# Patient Record
Sex: MALE
[De-identification: names, ages, dates, MRNs, and addresses within clinical notes are randomized per-mention and may not be internally consistent; named-entity substitution may affect disease eponyms.]

---

## 2021-04-14 ENCOUNTER — HOSPITAL ENCOUNTER (EMERGENCY)
Dept: HOSPITAL 5 - ED | Age: 51
LOS: 1 days | Discharge: HOME | End: 2021-04-15
Payer: SELF-PAY

## 2021-04-14 VITALS — SYSTOLIC BLOOD PRESSURE: 180 MMHG | DIASTOLIC BLOOD PRESSURE: 118 MMHG

## 2021-04-14 DIAGNOSIS — I10: ICD-10-CM

## 2021-04-14 DIAGNOSIS — F32.9: ICD-10-CM

## 2021-04-14 DIAGNOSIS — Z79.899: ICD-10-CM

## 2021-04-14 DIAGNOSIS — M25.562: Primary | ICD-10-CM

## 2021-04-14 DIAGNOSIS — E11.9: ICD-10-CM

## 2021-04-14 PROCEDURE — 99283 EMERGENCY DEPT VISIT LOW MDM: CPT

## 2021-04-14 NOTE — XRAY REPORT
LEFT TIBIA-FIBULA 2 VIEW(S)



INDICATION / CLINICAL INFORMATION:

pain to leg



COMPARISON:

None available.

 

FINDINGS:



BONES / JOINT(S): No acute fracture or subluxation. No significant arthritis.

SOFT TISSUES: No significant abnormality.



ADDITIONAL FINDINGS: None.







Signer Name: Alen Martin MD 

Signed: 4/14/2021 8:17 PM

Workstation Name: [id number]

## 2021-04-14 NOTE — EMERGENCY DEPARTMENT REPORT
ED Lower Extremity HPI





- General


Chief Complaint: Extremity Injury, Lower


Stated Complaint: L KNEE PAIN


Time Seen by Provider: 04/14/21 19:49


Source: patient


Mode of arrival: Ambulatory


Limitations: No Limitations





- History of Present Illness


Initial Comments: 


50-year-old -American male presents emerge department complaining of a 3-

hour history of left leg pain that is sharp and lancinating down the leg of 

unknown etiology.  He reports having a slip and fall sometime earlier today but 

is not sure that something to do with the pain.  Currently is ambulatory without

any complication reports no loss of bowel or bladder, no saddle paresthesias, no

fever, chills, sweats, no loss of strength but pain is worse with palpation 

range of motion.





MD Complaint: leg injury


-: Gradual


Injury: Leg: Left


Place: home


Severity: mild, moderate


Improves With: nothing


Worsens With: nothing


Associated Symptoms: denies: snap/pop sensation





- Related Data


                                    Allergies











Allergy/AdvReac Type Severity Reaction Status Date / Time


 


No Known Allergies Allergy   Verified 04/14/21 19:30














ED Review of Systems


ROS: 


Stated complaint: L KNEE PAIN


Other details as noted in HPI





Comment: All other systems reviewed and negative





ED Past Medical Hx





- Past Medical History


Previous Medical History?: Yes


Hx Hypertension: Yes


Hx Diabetes: Yes


Hx Psychiatric Treatment: Yes (bipolar)





- Surgical History


Past Surgical History?: No





- Social History


Smoking Status: Never Smoker


Substance Use Type: None





ED Physical Exam





- General


Limitations: No Limitations


General appearance: alert, in no apparent distress





- Head


Head exam: Present: atraumatic, normocephalic





- Eye


Eye exam: Present: normal appearance





- ENT


ENT exam: Present: mucous membranes moist





- Neck


Neck exam: Present: normal inspection





- Respiratory


Respiratory exam: Present: normal lung sounds bilaterally.  Absent: respiratory 

distress





- Cardiovascular


Cardiovascular Exam: Present: regular rate, normal rhythm.  Absent: systolic 

murmur, diastolic murmur, rubs, gallop





- GI/Abdominal


GI/Abdominal exam: Present: soft, normal bowel sounds





- Rectal


Rectal exam: Present: deferred





- Extremities Exam


Extremities exam: Present: normal inspection, tenderness





- Back Exam


Back exam: Present: normal inspection.  Absent: CVA tenderness (R), CVA 

tenderness (L), muscle spasm, paraspinal tenderness





- Neurological Exam


Neurological exam: Present: alert, oriented X3, CN II-XII intact, normal gait





- Psychiatric


Psychiatric exam: Present: normal affect, normal mood.  Absent: anxious, flat 

affect





- Skin


Skin exam: Present: warm, dry, intact, normal color.  Absent: rash, cyanosis, 

diaphoretic





ED Course


                                   Vital Signs











  04/14/21





  19:27


 


Temperature 98.7 F


 


Pulse Rate 94 H


 


Respiratory 18





Rate 


 


Blood Pressure 180/118


 


O2 Sat by Pulse 98





Oximetry 











Critical care attestation.: 


If time is entered above; I have spent that time in minutes in the direct care 

of this critically ill patient, excluding procedure time.








ED Disposition


Disposition: DC-01 TO HOME OR SELFCARE


Is pt being admited?: No


Does the pt Need Aspirin: No


Condition: Stable